# Patient Record
Sex: FEMALE | Race: BLACK OR AFRICAN AMERICAN | Employment: UNEMPLOYED | URBAN - METROPOLITAN AREA
[De-identification: names, ages, dates, MRNs, and addresses within clinical notes are randomized per-mention and may not be internally consistent; named-entity substitution may affect disease eponyms.]

---

## 2022-08-29 NOTE — BH NOTES
TRANSFER - IN REPORT:    Verbal report received from GUILLERMO MORENO [facility]) on Samuel So  being received from [facility] ED(unit) for routine progression of care      Report consisted of patients Situation, Background, Assessment and   Recommendations(SBAR). Information from the following report(s) SBAR was reviewed with the receiving nurse. Opportunity for questions and clarification was provided. Assessment completed upon patients arrival to unit and care assumed.

## 2022-08-30 ENCOUNTER — HOSPITAL ENCOUNTER (INPATIENT)
Age: 37
LOS: 3 days | Discharge: HOME | DRG: 566 | End: 2022-09-02
Attending: PSYCHIATRY & NEUROLOGY | Admitting: PSYCHIATRY & NEUROLOGY
Payer: MEDICAID

## 2022-08-30 PROBLEM — F32.9 MDD (MAJOR DEPRESSIVE DISORDER): Status: ACTIVE | Noted: 2022-08-30

## 2022-08-30 PROCEDURE — 74011250637 HC RX REV CODE- 250/637: Performed by: NURSE PRACTITIONER

## 2022-08-30 PROCEDURE — 65220000003 HC RM SEMIPRIVATE PSYCH

## 2022-08-30 RX ORDER — ADHESIVE BANDAGE
30 BANDAGE TOPICAL DAILY PRN
Status: DISCONTINUED | OUTPATIENT
Start: 2022-08-30 | End: 2022-08-30

## 2022-08-30 RX ORDER — HYDROXYZINE 50 MG/1
50 TABLET, FILM COATED ORAL
Status: DISCONTINUED | OUTPATIENT
Start: 2022-08-30 | End: 2022-08-30

## 2022-08-30 RX ORDER — DIPHENHYDRAMINE HYDROCHLORIDE 50 MG/ML
50 INJECTION, SOLUTION INTRAMUSCULAR; INTRAVENOUS
Status: DISCONTINUED | OUTPATIENT
Start: 2022-08-30 | End: 2022-08-30

## 2022-08-30 RX ORDER — ACETAMINOPHEN 325 MG/1
650 TABLET ORAL
Status: DISCONTINUED | OUTPATIENT
Start: 2022-08-30 | End: 2022-08-30

## 2022-08-30 RX ORDER — BENZTROPINE MESYLATE 1 MG/1
1 TABLET ORAL
Status: DISCONTINUED | OUTPATIENT
Start: 2022-08-30 | End: 2022-08-30

## 2022-08-30 RX ORDER — FOLIC ACID/MULTIVIT,IRON,MINER 0.4MG-18MG
1 TABLET ORAL DAILY
Status: DISCONTINUED | OUTPATIENT
Start: 2022-08-30 | End: 2022-09-02 | Stop reason: HOSPADM

## 2022-08-30 RX ADMIN — Medication 1 TABLET: at 09:38

## 2022-08-30 NOTE — H&P
[address] [facility] Adult  Hospitalist Group  History and Physical    Date of Service:  8/30/2022  Primary Care Provider: None  Source of information: The patient and Chart review    Chief Complaint: No chief complaint on file. History of Presenting Illness:   Last Robb is a 40 y.o. female was 31 weeks pregnant, presented to emergency department at outside facility. Patient was evaluated by behavioral health at that facility and referred to inpatient psychiatry for admission. Had endorsed depression and suicidal ideation. She does have a history of postpartum depression as well as MDD  Inpatient psychiatry provider, nurse practitioner Montrell over the hospitalist service for medical clearance and evaluation  At the moment of the initial interview she was cooperative with the exam.  She denied pain, fever, chills, nausea, dysuria. REVIEW OF SYSTEMS:  A comprehensive review of systems was negative except for that written in the History of Present Illness. No past medical history on file. No past surgical history on file. Prior to Admission medications    Not on File     No Known Allergies   No family history on file. Social History:       Family and social history were personally reviewed, all pertinent and relevant details are outlined as above.     Objective:   Visit Vitals  /66   Pulse 69   Temp 97.7 °F (36.5 °C)   Resp 16   SpO2 99%           PHYSICAL EXAM:   General: Alert x oriented x 3, awake, no acute distress, resting in bed, pleasant female, appears to be stated age  HEENT: PEERL, EOMI, moist mucus membranes  Neck: Supple, no JVD,   Chest: Clear to auscultation bilaterally   CVS: RRR, S1 S2 heard, no murmurs/rubs/gallops  Abd: Soft, non-tender, non-distended, +bowel sounds   Ext: No clubbing, no cyanosis, no edema  Neuro/Psych: Pleasant mood and affect,  sensory grossly within normal limit, Strength 5/5 in all extremities,   Cap refill: Brisk, less than 3 seconds  Pulses: 2+, symmetric in all extremities  Skin: Warm, dry, without rashes or lesions    Data Review: All diagnostic labs and studies have been reviewed. Abnormal Labs Reviewed - No data to display    All Micro Results       None            IMAGING:   No orders to display        ECG/ECHO:  No results found for this or any previous visit. Assessment:   Given the patient's current clinical presentation, there is a high level of concern for decompensation if discharged from the emergency department. Complex decision making was performed, which includes reviewing the patient's available past medical records, laboratory results, and imaging studies. Active Problems:    MDD (major depressive disorder) (8/30/2022)      Plan:   MDD  Per primary team    Pregnancy  Patient tells me she is high risk pregnant. OB hospitalist is aware she is here  Starting prenatal vitamin  Otherwise follow-up with primary OB      For the opportunity to participate in the care of this patient please call with any questions      Signed By: Perri Andrews NP     August 30, 2022         Please note that this dictation may have been completed with Mina Escalera, the computer voice recognition software. Quite often unanticipated grammatical, syntax, homophones, and other interpretive errors are inadvertently transcribed by the computer software. Please disregard these errors. Please excuse any errors that have escaped final proofreading.

## 2022-08-30 NOTE — PROGRESS NOTES
Patient participated in spirituality group on Aug 30, 2022. Rev.  Wendel Aase, MDiv, [facility], [facility]   paging service: [phone])

## 2022-08-30 NOTE — INTERDISCIPLINARY ROUNDS
Behavioral Health Interdisciplinary Rounds     Patient Name: Maris Waters  Age: 40 y.o. Room/Bed:  727/  Primary Diagnosis: <principal problem not specified>   Admission Status: TDO     Readmission within 30 days: no  Power of  in place: no  Patient requires a blocked bed: no               Consults: H&P         Labs/Testing due today? Have they refused labs?: no    Sleep hours: 3 hrs       Participation in Care/Groups:    Medication Compliant?:  PRNS (last 24 hours): none    Restraints (last 24 hours):  no     CIWA (range last 24 hours):     COWS (range last 24 hours):      Alcohol screening (AUDIT) completed -         If applicable, date SBIRT discussed in treatment team AND documented:   AUDIT Screen Score:        Document Brief Intervention (corresponds directly with the 5 A's, Ask, Advise, Assess, Assist, and Arrange): At- Risk Patients (Score 7-15 for women; 8-15 for men)  Discuss concern patient is drinking at unhealthy levels known to increase risk of alcohol-related health problems. Is Patient ready to commit to change? If No:  Encourage reflection  Discuss short term and long term health risks of consuming alcohol  Barriers to change  Reaffirm willingness to help / Educational materials provided  If Yes:  Set goal  Plan  Educational materials provided    Harmful use or Dependence (Score 16 or greater)  Discuss short term and long term health risks of consuming alcohol  Recommendations  Negotiate drinking goal  Recommend addiction specialist/center  Arrange follow-up appointments.     Tobacco - patient is a smoker:    Illegal Drugs use:      24 hour chart check complete: yes   ____________________________________________________________________________________________________________    Patient goal(s) for today:   Treatment team focus/goals:   Progress note     LOS:  0  Expected LOS:     Financial concerns/prescription coverage:    Family contact:       Family requesting physician contact today:    Discharge plan:   Access to weapons :         Outpatient provider(s):   Patient's preferred phone number for follow up call :   Patient's preferred e-mail address :  Participating treatment team members: Marissa Up, * (assigned SW),

## 2022-08-30 NOTE — BH NOTES
PSYCHOSOCIAL ASSESSMENT  :Patient identifying info:   Terry Alvarez is a 40 y.o., female admitted 2022  1:57 AM     Presenting problem and precipitating factors: Pt was transferred to [facility] from [facility] for SI and detox. Pt is 30 wks pregnant. Mental status assessment: Pt is alert and oriented x4. Pt denies current SI, HI, AVH. Pts mood is euthymic and congruent with appearance. Pt is calm, cooperative. Strengths/Recreation/Coping Skills:    Collateral information:     Current psychiatric /substance abuse providers and contact info: Maria Victoria Matthews, [phone], Dr. Fermín Grant    Previous psychiatric/substance abuse providers and response to treatment: Pt has hx of ISABELLA tx    Family history of mental illness or substance abuse: unknown    Substance abuse history:  Pt uses crack/cocaine, THC  Social History     Tobacco Use    Smoking status: Not on file    Smokeless tobacco: Not on file   Substance Use Topics    Alcohol use: Not on file       History of biomedical complications associated with substance abuse: no     Patient's current acceptance of treatment or motivation for change: TDO     Family constellation: , 8 Children, pregnant    Is significant other involved? No     Describe support system:  Mother     Describe living arrangements and home environment: Pt lives with mother     GUARDIAN/POA: NO    Guardian Name:     Guardian Contact:     Health issues:   Hospital Problems  Never Reviewed            Codes Class Noted POA    MDD (major depressive disorder) ICD-10-CM: F32.9  ICD-9-CM: 296.20  2022 Unknown           Trauma history: yes    Legal issues: no     History of  service: no     Financial status: unknown    Rastafarian/cultural factors: not reported     Education/work history: Unemployed    Have you been licensed as a health care professional (current or ): no     Describe coping skills:Ineffectual     Ramila Deter  2022

## 2022-08-30 NOTE — PROGRESS NOTES
Patient up and out on unit for meals. Med/meal compliant; denies SI/HI. Calm/cooperative.  Staff focus is to ensure patient remains safe while in the hospital.   Problem: Depressed Mood (Adult/Pediatric)  Goal: *STG: Complies with medication therapy  8/30/2022 1831 by Alejandra Goodman, RN  Outcome: Progressing Towards Goal  8/30/2022 1613 by Alejandra Goodman, RN  Outcome: Progressing Towards Goal  Goal: Interventions  Outcome: Progressing Towards Goal

## 2022-08-30 NOTE — BH NOTES
GROUP THERAPY PROGRESS NOTE    Patient did not participate in Healthy living, coping and wellness education group.     Mel SOLANO, [facility]

## 2022-08-30 NOTE — PROGRESS NOTES
Problem: Depressed Mood (Adult/Pediatric)  Goal: *STG: Participates in treatment plan  Outcome: Progressing Towards Goal  Note: Out on unit demonstrates ability to interact w peers and staff. Mood and affect congruent, sad and slightly anxious. Denies SI, no self harming behaviors. Daily goal is to talk with tx team about dc planning and follow up care. Staff focus is on offering [phone]: OB consult Dr. Radha Diehl [phone] called requested clarification on purpose of consult from hospitalist Rissa Carlson NP. Perfect served message sent to him. Will await call back . 1334: pt confirmed with family OB/GYN appointment is made with [facility]. Appointment and office info entered in d/c follow up. Tx team made aware.    Goal: *STG: Demonstrates reduction in symptoms and increase in insight into coping skills/future focused  Outcome: Progressing Towards Goal  Goal: Interventions  Outcome: Progressing Towards Goal     Problem: Chemical Dependency (Adult/Pediatric)  Goal: *STG: Will identify negative impact of chemical dependency including the use of tobacco, alcohol, and other substances  Outcome: Progressing Towards Goal  Goal: *STG: Agrees to participate in outpatient after care program to support ongoing mental health  Outcome: Devin Howard Treatment Plan for Mariposa Garland    Date Treatment Plan Initiated: 8/30/22    Treatment Plan Modalities:  Type of Modality Amount  (x minutes) Frequency (x/week) Duration (x days) Name of Responsible Staff   Community & wrap-up meetings to encourage peer interactions 15 7 1 Jose Palumbo     Group psychotherapy to assist in building coping skills and internal controls 60 7 1 Valeri Antonio MSW   Therapeutic activity groups to build coping skills 60 7 1 Valeri Antonio MSW   Psychoeducation in group setting to address:   Medication education   [address] PharmD   Coping skills   30 3 [phone] [facility], Daniel Alcocer   Relaxation techniques         Symptom management         Discharge planning   60 2 1 Earnestine Lee   Spirituality    60 2 1 Chaplain BENAVIDES   61 1 1 volunteer   Recovery/AA/NA      volunteer   Physician medication management   15 7 [address] [facility]   Family meeting/discharge planning   15 2 1 Brigid Navarrete

## 2022-08-30 NOTE — BH NOTES
Admission Note  Admitting Diagnosis: SI, depression/anxiety, polysubstance abuse        Admitting Status: [facility]        Patient Received From: Miracle Mackey        Patient Condition Upon Arrival: Cooperative        BAL & UDS: Bal neg, UDS + cocaine, THC, oxycodone     Reason For Admission: SI plan to OD     Skin assessment completed by: Lior Jeronimo RN    Toni score: 23, left black eye    Patient belongings secured by: Noah Rodriguez    Patient consented to safety while on the unit. Patient signed consent forms upon arriving. Denies suicidal and homicidal ideation. Does not appear to be responding to internal stimuli. Patient was calm and cooperative, didn't want to answer questions during admission process. Will continue to monitor with Q15 minute checks.

## 2022-08-31 LAB — UR CULT HOLD, URHOLD: NORMAL

## 2022-08-31 PROCEDURE — 74011250637 HC RX REV CODE- 250/637: Performed by: NURSE PRACTITIONER

## 2022-08-31 PROCEDURE — 87491 CHLMYD TRACH DNA AMP PROBE: CPT

## 2022-08-31 PROCEDURE — 65220000003 HC RM SEMIPRIVATE PSYCH

## 2022-08-31 RX ORDER — ESCITALOPRAM OXALATE 10 MG/1
10 TABLET ORAL DAILY
Status: DISCONTINUED | OUTPATIENT
Start: 2022-08-31 | End: 2022-09-02 | Stop reason: HOSPADM

## 2022-08-31 RX ADMIN — ESCITALOPRAM OXALATE 10 MG: 10 TABLET ORAL at 11:00

## 2022-08-31 RX ADMIN — Medication 1 TABLET: at 10:09

## 2022-08-31 NOTE — INTERDISCIPLINARY ROUNDS
Behavioral Health Interdisciplinary Rounds     Patient Name: Raul Officer  Age: 40 y.o. Room/Bed:  72/  Primary Diagnosis: <principal problem not specified>   Admission Status: TDO     Readmission within 30 days: no  Power of  in place: no  Patient requires a blocked bed: no            Consults: OBGYN       Labs/Testing due today? Have they refused labs?: yes    Sleep hours:  6      Participation in Care/Groups:  yes  Medication Compliant?: Yes  PRNS (last 24 hours): None    Restraints (last 24 hours):  no     CIWA (range last 24 hours):     COWS (range last 24 hours):      Alcohol screening (AUDIT) completed -         If applicable, date SBIRT discussed in treatment team AND documented:   AUDIT Screen Score:        Document Brief Intervention (corresponds directly with the 5 A's, Ask, Advise, Assess, Assist, and Arrange): At- Risk Patients (Score 7-15 for women; 8-15 for men)  Discuss concern patient is drinking at unhealthy levels known to increase risk of alcohol-related health problems. Is Patient ready to commit to change? If No:  Encourage reflection  Discuss short term and long term health risks of consuming alcohol  Barriers to change  Reaffirm willingness to help / Educational materials provided  If Yes:  Set goal  Plan  Educational materials provided    Harmful use or Dependence (Score 16 or greater)  Discuss short term and long term health risks of consuming alcohol  Recommendations  Negotiate drinking goal  Recommend addiction specialist/center  Arrange follow-up appointments.     Tobacco - patient is a smoker:    Illegal Drugs use:      24 hour chart check complete: yes   ____________________________________________________________________________________________________________    Patient goal(s) for today:  Treatment team focus/goals:  Progress note    LOS:  1  Expected LOS:     Financial concerns/prescription coverage:    Family contact:       Family requesting physician contact today:    Discharge plan:   Access to weapons :         Outpatient provider(s):   Patient's preferred phone number for follow up call :   Patient's preferred e-mail address :  Participating treatment team members: Radha Bass * (assigned SW),

## 2022-08-31 NOTE — PROGRESS NOTES
Problem: Falls - Risk of  Goal: *Absence of Falls  Description: Document Dejah Mendiola Fall Risk and appropriate interventions in the flowsheet. Outcome: Progressing Towards Goal  Note: Fall Risk Interventions:            Medication Interventions: Teach patient to arise slowly       Patient received, appears to be asleep, eyes closed, breathing even and unlabored. No signs of distress noted. Will continue to monitor for safety.

## 2022-08-31 NOTE — PROGRESS NOTES
Patient visible on unit. NAD. Meal and medication compliant. Calm, cooperative, and pleasant. Remains safe on unit. Will continue to monitor with q15min rounds and provide support. Problem: Falls - Risk of  Goal: *Absence of Falls  Description: Document [place] Fall Risk and appropriate interventions in the flowsheet.   Outcome: Progressing Towards Goal  Note: Fall Risk Interventions:            Medication Interventions: Teach patient to arise slowly                   Problem: Depressed Mood (Adult/Pediatric)  Goal: *STG: Participates in treatment plan  Outcome: Progressing Towards Goal  Goal: *STG: Demonstrates reduction in symptoms and increase in insight into coping skills/future focused  Outcome: Progressing Towards Goal  Goal: *STG: Complies with medication therapy  Outcome: Progressing Towards Goal
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
## 2022-08-31 NOTE — BH NOTES
PSYCHIATRIC PROGRESS NOTE       Patient: María Elena Varner MRN: [id number]  SSN: [id number]    YOB: 1985  Age: 40 y.o. Sex: female      Admit Date: 8/30/2022    LOS: 1 day       Chief Complaint:  Can I get tested for hiv? Interval History:  María Elena Varner volunteered from her hearing this morning. She is asking for her lexapro, she is aware of the risks of it, and gave verbal informed consent to be started. She is also requesting to get tested for hiv and std. She denies si hi or avh. Sleeping and eating ok. Calm and pleasant. She agreed for CM to speak to her mother. Past Medical History:  No past medical history on file. ALLERGIES:(reviewed/updated 8/31/2022)  No Known Allergies    Laboratory report:  No results found for: WBC, WBCLT, HGBPOC, HGB, HGBP, HCTPOC, HCT, PHCT, RBCH, PLT, MCV, HGBEXT, HCTEXT, PLTEXT No results found for: NA, K, CL, CO2, AGAP, GLU, BUN, CREA, BUCR, GFRAA, GFRNA, CA, TBIL, TBILI, AP, TP, ALB, GLOB, AGRAT, ALT   Vitals:    08/30/22 0800 08/30/22 1330 08/30/22 2114 08/31/22 0819   BP: 106/63 107/66 98/64 98/60   Pulse: 88 69 74 78   Resp: 18 16 17 18   Temp: 97.9 °F (36.6 °C) 97.7 °F (36.5 °C) 98.4 °F (36.9 °C) 97.5 °F (36.4 °C)   SpO2:   92% 98%       No results found for: VALF2, VALAC, VALP, VALPR, DS6, CRBAM, CRBAMP, CARB2, XCRBAM  No results found for: LITHM    Vital Signs  Patient Vitals for the past 24 hrs:   Temp Pulse Resp BP SpO2   08/31/22 0819 97.5 °F (36.4 °C) 78 18 98/60 98 %   08/30/22 2114 98.4 °F (36.9 °C) 74 17 98/64 92 %   08/30/22 1330 97.7 °F (36.5 °C) 69 16 107/66 --     Wt Readings from Last 3 Encounters:   No data found for Wt     Temp Readings from Last 3 Encounters:   08/31/22 97.5 °F (36.4 °C)     BP Readings from Last 3 Encounters:   08/31/22 98/60     Pulse Readings from Last 3 Encounters:   08/31/22 78       Radiology (reviewed/updated 8/31/2022)  No results found.     Current Facility-Administered Medications   Medication Dose Route Frequency Provider Last Rate Last Admin    prenatal vitamin tablet 1 Tablet  1 Tablet Oral DAILY Prachi Bales NP   1 Tablet at 08/30/22 5504       Side Effects: (reviewed/updated 8/31/2022)  None reported or admitted to. Review of Systems: (reviewed/updated 8/31/2022)  Appetite: good  Sleep: good   All other Review of Systems: negative    Mental Status Exam:  Eye contact: Good eye contact  Psychomotor activity: wnl  Speech is spontaneous  Thought process: Logical and goal directed   Mood is \"ok\"  Affect: Blunted  Perception: No avh  Suicidal ideation: No si  Homicidal ideation: No hi  Insight/judgment: Poor  Cognition is grossly intact      Physical Exam:  Musculoskeletal system: steady gait  Tremor not present  Cog wheeling not present      Assessment and Plan:  Mariposa Garland meets criteria for a diagnosis of Unspecified mood disorder. Stimulant use disorder, cocaine, severe. Opioid use disorder. Cannabis use disorder. Lexapro 10 mg daily. Continue rest of medications as prescribed. We will closely monitor for safety. We will encourage reality orientation. Disposition planning to continue. I certify that this patients inpatient psychiatric hospital services furnished since the previous certification were, and continue to be, required for treatment that could reasonably be expected to improve the patient's condition, or for diagnostic study, and that the patient continues to need, on a daily basis, active treatment furnished directly by or requiring the supervision of inpatient psychiatric facility personnel. In addition, the hospital records show that services furnished were intensive treatment services, admission or related services, or equivalent services.       Signed:  Cecilia Huang NP  8/31/2022

## 2022-09-01 LAB
CHOLEST SERPL-MCNC: 167 MG/DL
GLUCOSE P FAST SERPL-MCNC: 81 MG/DL (ref 65–100)
HDLC SERPL-MCNC: 65 MG/DL
HDLC SERPL: 2.6 {RATIO} (ref 0–5)
LDLC SERPL CALC-MCNC: 73.6 MG/DL (ref 0–100)
RPR SER QL: NONREACTIVE
TRIGL SERPL-MCNC: 142 MG/DL (ref ?–150)
TSH SERPL DL<=0.05 MIU/L-ACNC: 0.88 UIU/ML (ref 0.36–3.74)
VLDLC SERPL CALC-MCNC: 28.4 MG/DL

## 2022-09-01 PROCEDURE — 65220000003 HC RM SEMIPRIVATE PSYCH

## 2022-09-01 PROCEDURE — 84443 ASSAY THYROID STIM HORMONE: CPT

## 2022-09-01 PROCEDURE — 74011250637 HC RX REV CODE- 250/637: Performed by: NURSE PRACTITIONER

## 2022-09-01 PROCEDURE — 36415 COLL VENOUS BLD VENIPUNCTURE: CPT

## 2022-09-01 PROCEDURE — 80061 LIPID PANEL: CPT

## 2022-09-01 PROCEDURE — 87536 HIV-1 QUANT&REVRSE TRNSCRPJ: CPT

## 2022-09-01 PROCEDURE — 86592 SYPHILIS TEST NON-TREP QUAL: CPT

## 2022-09-01 PROCEDURE — 82947 ASSAY GLUCOSE BLOOD QUANT: CPT

## 2022-09-01 RX ADMIN — Medication 1 TABLET: at 08:00

## 2022-09-01 RX ADMIN — ESCITALOPRAM OXALATE 10 MG: 10 TABLET ORAL at 08:00

## 2022-09-01 NOTE — PROGRESS NOTES
Problem: Discharge Planning  Goal: *Discharge to safe environment  Outcome: Progressing Towards Goal  Note: Pt will discharge to [facility] program  Goal: *Knowledge of medication management  Outcome: Progressing Towards Goal  Goal: *Knowledge of discharge instructions  Outcome: Progressing Towards Goal

## 2022-09-01 NOTE — INTERDISCIPLINARY ROUNDS
Behavioral Health Interdisciplinary Rounds     Patient Name: Lili Lino  Age: 40 y.o. Room/Bed:  727/  Primary Diagnosis: <principal problem not specified>   Admission Status: Voluntary     Readmission within 30 days: no  Power of  in place: no  Patient requires a blocked bed: no          Reason for blocked bed:     Sleep hours:        Participation in Care/Groups:    Medication Compliant?: Yes  PRNS (last 24 hours): None    Restraints (last 24 hours):  no  Substance Abuse:  no    Alcohol screening (AUDIT) completed -     If applicable, date SBIRT discussed in treatment team AND documented:   Tobacco -   24 hour chart check complete: yes   ______________________________________    Patient goal(s) for today: Communicate needs to staff  Treatment team focus/goals: Assess pt, manage medications, discharge planning   Progress note: Pt is stable. Pt denies SI, HI, AVH. Pt has strong safety plan and follow up in place. Pt s mood is euthymic and congruent with appearance. SW spokw with pts mother and CM, Ryan Navarro.          Financial concerns/prescription coverage:    Family contact:                        Family requesting physician contact today:    Discharge plan: Pt will discharge to [place] [address] housing   Access to weapons None :                                                              Outpatient provider(s): Mariah Quigley, Dr Junaid Jane  Patient's preferred phone number for follow up call :   Patient's preferred e-mail address :  Participating treatment team members:    LOS:  2 Expected LOS: 9/2/22    Participating treatment team members: Lili Lino, XIOMARA Warner, Aric Bernal, MICHAEL

## 2022-09-01 NOTE — PROGRESS NOTES
Problem: Depressed Mood (Adult/Pediatric)  Goal: *STG: Participates in treatment plan  Outcome: Progressing Towards Goal  Goal: *STG: Attends activities and groups  Outcome: Progressing Towards Goal  Goal: *STG: Complies with medication therapy  Outcome: Progressing Towards Goal   Patient is resting quietly in bed with eyes closed. Appears to be asleep. No respiratory distress noted. 15 minutes safety monitoring continues.

## 2022-09-01 NOTE — DISCHARGE INSTRUCTIONS
DISCHARGE SUMMARY    Dorota Boland  : 1985  MRN: [id number]    The patient Corbin Springer exhibits the ability to control behavior in a less restrictive environment. Patient's level of functioning is improving. No assaultive/destructive behavior has been observed for the past 24 hours. No suicidal/homicidal threat or behavior has been observed for the past 24 hours. There is no evidence of serious medication side effects. Patient has not been in physical or protective restraints for at least the past 24 hours. If weapons involved, how are they secured? none    Is patient aware of and in agreement with discharge plan? yes    Arrangements for medication:  Prescriptions given to patient, given a weeks supply or 30 day supply. Copy of discharge instructions to provider?:  yes    Arrangements for transportation home:  family will      Keep all follow up appointments as scheduled, continue to take prescribed medications per physician instructions.   Mental health crisis number:  [phone] or your local mental health crisis line number at [address] [facility] WARM LINE      [phone] ([phone])      M-F: 9am to 9pm      Sat & Sun: [address] [facility] suicide prevention lines:                             0-286-KIWOSTP ([phone])       [phone] ([phone])    Crisis Text Line:  Text HOME to [phone]

## 2022-09-01 NOTE — BH NOTES
GROUP THERAPY PROGRESS NOTE     Patient is participating in Yoga Therapy Group     Group time: 60 minutes     Personal goal for participation:  To develop mindfulness skills and increase mind/body awareness     Goal orientation: Personal     Group therapy participation: Active     Therapeutic interventions reviewed and discussed:  Group members were guided through a series of therapeutic exercises including eye movements, poses, breathing, and a guided deep relaxation. Patients were encouraged to practice mindfulness and focus on relaxation and deep breathing. Impression of participation:  Ganesh Espinoza participated in the group where she could and was able to verbalize an understanding of how some of the practices could help her feel calm.

## 2022-09-01 NOTE — BH NOTES
PSYCHIATRIC PROGRESS NOTE       Patient: Lazara Estevez MRN: [id number]  SSN: [id number]    YOB: 1985  Age: 40 y.o. Sex: female      Admit Date: 8/30/2022    LOS: 2 days       Chief Complaint:  I am doing better. Interval History:  Lazara Estevez says she is doing well. She is out in the unit, social with staff and peers. Sleeping and eating fairly well. She denies si hi or avh. She is calm and pleasant. She has a good insight, she is fully aware how substance use has affected her and the people around her. STD and HIV results are pending. At the present time the patient Lazara Estevez remains compliant with taking medications. Denies any adverse events from taking them and feels they have been beneficial.     Past Medical History:  No past medical history on file.       ALLERGIES:(reviewed/updated 9/1/2022)  No Known Allergies    Laboratory report:  No results found for: WBC, WBCLT, HGBPOC, HGB, HGBP, HCTPOC, HCT, PHCT, RBCH, PLT, MCV, HGBEXT, HCTEXT, PLTEXT, HGBEXT, HCTEXT, PLTEXT   Lab Results   Component Value Date/Time    Glucose 81 09/01/2022 06:15 AM      Vitals:    08/31/22 0907 08/31/22 1606 08/31/22 2035 09/01/22 0836   BP:  111/62 122/64 103/61   Pulse:  74 90 83   Resp:  16 16 16   Temp:  97.9 °F (36.6 °C) 98.4 °F (36.9 °C) 98 °F (36.7 °C)   SpO2:  100% 99% 98%   Weight: 63.5 kg (140 lb)      Height: 5' (1.524 m)          No results found for: VALF2, VALAC, VALP, VALPR, DS6, CRBAM, CRBAMP, CARB2, XCRBAM  No results found for: LITHM    Vital Signs  Patient Vitals for the past 24 hrs:   Temp Pulse Resp BP SpO2   09/01/22 0836 98 °F (36.7 °C) 83 16 103/61 98 %   08/31/22 2035 98.4 °F (36.9 °C) 90 16 122/64 99 %   08/31/22 1606 97.9 °F (36.6 °C) 74 16 111/62 100 %       Wt Readings from Last 3 Encounters:   08/31/22 63.5 kg (140 lb)     Temp Readings from Last 3 Encounters:   09/01/22 98 °F (36.7 °C)     BP Readings from Last 3 Encounters:   09/01/22 103/61     Pulse Readings from Last 3 Encounters:   09/01/22 83       Radiology (reviewed/updated 9/1/2022)  No results found. Current Facility-Administered Medications   Medication Dose Route Frequency Provider Last Rate Last Admin    escitalopram oxalate (LEXAPRO) tablet 10 mg  10 mg Oral DAILY Laverne AllenMICHAEL   10 mg at 09/01/22 0800    prenatal vitamin tablet 1 Tablet  1 Tablet Oral DAILY Jose Stafford NP   1 Tablet at 09/01/22 0800       Side Effects: (reviewed/updated 9/1/2022)  None reported or admitted to. Review of Systems: (reviewed/updated 9/1/2022)  Appetite: good  Sleep: good   All other Review of Systems: negative    Mental Status Exam:  Eye contact: Good eye contact  Psychomotor activity: wnl  Speech is spontaneous  Thought process: Logical and goal directed   Mood is \"ok\"  Affect: full  Perception: No avh  Suicidal ideation: No si  Homicidal ideation: No hi  Insight/judgment: Poor  Cognition is grossly intact      Physical Exam:  Musculoskeletal system: steady gait  Tremor not present  Cog wheeling not present      Assessment and Plan:  Marissa Up meets criteria for a diagnosis of Unspecified mood disorder. Stimulant use disorder, cocaine, severe. Opioid use disorder. Cannabis use disorder. Continue current medications as prescribed. We will closely monitor for safety. We will encourage reality orientation. Disposition planning to continue. I certify that this patients inpatient psychiatric hospital services furnished since the previous certification were, and continue to be, required for treatment that could reasonably be expected to improve the patient's condition, or for diagnostic study, and that the patient continues to need, on a daily basis, active treatment furnished directly by or requiring the supervision of inpatient psychiatric facility personnel.  In addition, the hospital records show that services furnished were intensive treatment services, admission or related services, or equivalent services.       Signed:  Kathe Joy NP  9/1/2022

## 2022-09-01 NOTE — GROUP NOTE
ETHAN  GROUP DOCUMENTATION INDIVIDUAL                                                                          Group Therapy Note    Date: 9/1/2022    Group Start Time: 5698  Group End Time: 0815  Group Topic: Community Meeting    [facility] [address] [facility]; [place], [address] [place] [address] GROUP DOCUMENTATION GROUP    Group Therapy Note    Attendees: 11/12       Attendance: Attended    Patient's Goal:  Understand unit rules and routine. Interventions/techniques: Informed and Supported    Follows Directions: Followed directions    Interactions: Interacted appropriately    Mental Status: Calm    Behavior/appearance: Cooperative    Goals Achieved: Able to engage in interactions, Able to listen to others, Able to reflect/comment on own behavior, and Able to manage/cope with feelings      Additional Notes: [address] group discussing rules and routine of General Unit. Patient and peers discussed their goals and set times to achieve them within the day. Opportunity for questions was offered.     [address]

## 2022-09-02 VITALS
HEART RATE: 72 BPM | OXYGEN SATURATION: 98 % | BODY MASS INDEX: 27.48 KG/M2 | WEIGHT: 140 LBS | RESPIRATION RATE: 14 BRPM | HEIGHT: 60 IN | TEMPERATURE: 98.3 F | SYSTOLIC BLOOD PRESSURE: 133 MMHG | DIASTOLIC BLOOD PRESSURE: 73 MMHG

## 2022-09-02 LAB
HIV1 RNA # SERPL NAA+PROBE: <20 COPIES/ML
HIV1 RNA SERPL NAA+PROBE-LOG#: NORMAL LOG10COPY/ML

## 2022-09-02 PROCEDURE — 74011250637 HC RX REV CODE- 250/637: Performed by: NURSE PRACTITIONER

## 2022-09-02 RX ORDER — FOLIC ACID/MULTIVIT,IRON,MINER 0.4MG-18MG
1 TABLET ORAL DAILY
Qty: 30 TABLET | Refills: 0 | Status: SHIPPED | OUTPATIENT
Start: 2022-09-03 | End: 2022-10-03

## 2022-09-02 RX ORDER — ESCITALOPRAM OXALATE 10 MG/1
10 TABLET ORAL DAILY
Qty: 30 TABLET | Refills: 0 | Status: SHIPPED | OUTPATIENT
Start: 2022-09-03 | End: 2022-10-03

## 2022-09-02 RX ADMIN — ESCITALOPRAM OXALATE 10 MG: 10 TABLET ORAL at 09:25

## 2022-09-02 RX ADMIN — Medication 1 TABLET: at 09:25

## 2022-09-02 NOTE — INTERDISCIPLINARY ROUNDS
Behavioral Health Interdisciplinary Rounds     Patient Name: María Elena Varner  Age: 40 y.o. Room/Bed:  742/  Primary Diagnosis: <principal problem not specified>   Admission Status: Voluntary     Readmission within 30 days: no  Power of  in place: no  Patient requires a blocked bed: no          Reason for blocked bed:   Sleep hours: 7       Participation in Care/Groups:  no  Medication Compliant?: Yes  PRNS (last 24 hours): None    Restraints (last 24 hours):  no     Alcohol screening (AUDIT) completed -     If applicable, date SBIRT discussed in treatment team AND documented:    Tobacco - patient is a smoker: Have You Used Tobacco in the Past 30 Days: Yes  Illegal Drugs use: Have You Used Any Illegal Substances Over the Past 12 Months: No    24 hour chart check complete:      _______________________________________________    Patient goal(s) for today: Discharge   Treatment team focus/goals:   Progress note: Pt is stable. Pt denies SI, HI, AVH        Financial concerns/prescription coverage:    Family contact:                        Family requesting physician contact today:    Discharge plan: Pt will discharge to [facility]  Access to weapons no :                                                               Outpatient provider(s): Gabriele Crane, [address] [place] [address]  Patient's preferred phone number for follow up call :   Patient's preferred e-mail address :  Participating treatment team members:    LOS:  3 Expected LOS: 9/2/22    Participating treatment team members: Sandy Ford, MSW, Dr. Durga Krishna

## 2022-09-02 NOTE — BH NOTES
Behavioral Health Transition Record to Provider    Patient Name: Krista Allen  YOB: 1985  Medical Record Number: [id number]  Date of Admission: 8/30/2022  Date of Discharge: 9/2/22    Attending Provider: Jo Paiz MD  Discharging Provider: Dr. Melinda Gonzalez  To contact this individual call [phone] and ask the  to page. If unavailable, ask to be transferred to [facility] Provider on call. [facility] Provider will be available on call 24/7 and during holidays. Primary Care Provider: None    No Known Allergies    Reason for Admission: The patient is a 59-year-old 34 weeks' pregnant female who is admitted at [facility] inpatient psychiatric unit on a temporary custodial order. She is a transfer from [facility]. She presented to the hospital with worsening mood, depression, and suicidal ideation. She states that she was in crisis due to the repeated failed attempts to get sober. She had endorsed that she ended up entertaining thoughts of suicide with a plan to overdose. The patient has a long struggle with substance use; particularly cocaine, THC. She reports that her drugs of choice are cocaine and THC. She reports that she also has been taking oxycodone at night from generalized pain. She is unable to pin down how much she has been using, but she admits that she has been using a significant amount. She also shares that she had been quite depressed. She states that she has 8 children and one on the way. She had her first son at age 15 and she kept him until he was almost 2, and she noted that she would keep her children up until age 3 and 1 and then she would give them away  to their fathers or to the patient's mother. She reports that she was previously on Lexapro and would like to get back on it.   I informed her that the risk of starting the medication such as fetal malformation and teratogenicity and she indicated understanding. She gave verbal informed consent to be started on Lexapro. She denies suicidal ideation, homicidal ideation, auditory or visual hallucinations. Her plan is to get her substance use controlled. Admission Diagnosis: MDD (major depressive disorder) [F32.9]    * No surgery found *    Results for orders placed or performed during the hospital encounter of 08/30/22   URINE CULTURE HOLD SAMPLE    Specimen: Serum   Result Value Ref Range    Urine culture hold        Urine on hold in Microbiology dept for 2 days. If unpreserved urine is submitted, it cannot be used for addtional testing after 24 hours, recollection will be required. GLUCOSE, FASTING   Result Value Ref Range    Glucose 81 65 - 100 MG/DL   LIPID PANEL   Result Value Ref Range    Cholesterol, total 167 <200 MG/DL    Triglyceride 142 <150 MG/DL    HDL Cholesterol 65 MG/DL    LDL, calculated 73.6 0 - 100 MG/DL    VLDL, calculated 28.4 MG/DL    CHOL/HDL Ratio 2.6 0.0 - 5.0     RPR   Result Value Ref Range    RPR NONREACTIVE NR     TSH 3RD GENERATION   Result Value Ref Range    TSH 0.88 0.36 - 3.74 uIU/mL       Immunizations administered during this encounter: There is no immunization history on file for this patient. Screening for Metabolic Disorders for Patients on Antipsychotic Medications  (Data obtained from the EMR)    Estimated Body Mass Index  Estimated body mass index is 27.34 kg/m² as calculated from the following:    Height as of this encounter: 5' (1.524 m). Weight as of this encounter: 63.5 kg (140 lb).      Vital Signs/Blood Pressure  Visit Vitals  /73 (BP 1 Location: Left upper arm, BP Patient Position: Sitting)   Pulse 72   Temp 98.3 °F (36.8 °C)   Resp 14   Ht 5' (1.524 m)   Wt 63.5 kg (140 lb)   SpO2 98%   BMI 27.34 kg/m²       Blood Glucose/Hemoglobin A1c  Lab Results   Component Value Date/Time    Glucose 81 09/01/2022 06:15 AM       No results found for: HBA1C, [id number]     Lipid Panel  Lab Results Component Value Date/Time    Cholesterol, total 167 09/01/2022 06:15 AM    HDL Cholesterol 65 09/01/2022 06:15 AM    LDL, calculated 73.6 09/01/2022 06:15 AM    Triglyceride 142 09/01/2022 06:15 AM    CHOL/HDL Ratio 2.6 09/01/2022 06:15 AM        Discharge Diagnosis: Unspecified mood disorder. Stimulant use disorder, cocaine, severe. Opioid use disorder. Cannabis use disorder    Discharge Plan: The patient Shana Vidal exhibits the ability to control behavior in a less restrictive environment. Patient's level of functioning is improving. No assaultive/destructive behavior has been observed for the past 24 hours. No suicidal/homicidal threat or behavior has been observed for the past 24 hours. There is no evidence of serious medication side effects. Patient has not been in physical or protective restraints for at least the past 24 hours. If weapons involved, how are they secured? none    Is patient aware of and in agreement with discharge plan? yes    Arrangements for medication:  Prescriptions given to patient, given a weeks supply or 30 day supply. Copy of discharge instructions to provider?:  yes    Arrangements for transportation home:  family will      Keep all follow up appointments as scheduled, continue to take prescribed medications per physician instructions. Mental health crisis number:  [phone] or your local mental health crisis line number at [address] [facility] WARM LINE      [phone] ([phone])      M-F: 9am to 9pm      Sat & Sun: [address] [place] [address] suicide prevention lines:                             0-872-GPMCWRZ ([phone])       [phone] ([phone])   24/7 Crisis Text Line:  Text HOME to [phone]     Discharge Medication List and Instructions:   Current Discharge Medication List        START taking these medications    Details   escitalopram oxalate (LEXAPRO) 10 mg tablet Take 1 Tablet by mouth daily for 30 days.  Indications: major depressive disorder  Qty: 30 Tablet, Refills: 0  Start date: 9/3/2022, End date: 10/3/2022      prenatal vitamin 28 mg iron- 800 mcg tab tablet Take 1 Tablet by mouth daily for 30 days. Indications: pregnancy  Qty: 30 Tablet, Refills: 0  Start date: 9/3/2022, End date: 10/3/2022             Unresulted Labs (24h ago, onward)      None          To obtain results of studies pending at discharge, please contact [phone]    Follow-up Information       Follow up With Specialties Details Why Contact Info    [facility] Obstetrics & Gynecology  Follow up on 9/14/2022  Located in: [facility]  Address: [address] [place] [address]  Hours:   Open · Closes 5PM  Phone: [phone]    Francisca Boeck  Go on 9/6/2022 For Follow Up with Dr. Shaquille Mcdonald / [address],  [place], [address] [facility]   [phone]    None    None ([phone]) Patient stated that they have no PCP              Advanced Directive:   Does the patient have an appointed surrogate decision maker? No  Does the patient have a Medical Advance Directive? No  Does the patient have a Psychiatric Advance Directive? No  If the patient does not have a surrogate or Medical Advance Directive AND Psychiatric Advance Directive, the patient was offered information on these advance directives Patient declined to complete    Patient Instructions: Please continue all medications until otherwise directed by physician. Tobacco Cessation Discharge Plan:   Is the patient a smoker and needs referral for smoking cessation? Yes  Patient referred to the following for smoking cessation with an appointment? Refused     Patient was offered medication to assist with smoking cessation at discharge? Refused  Was education for smoking cessation added to the discharge instructions? Yes    Alcohol/Substance Abuse Discharge Plan:   Does the patient have a history of substance/alcohol abuse and requires a referral for treatment?  Yes  Patient referred to the following for substance/alcohol abuse treatment with an appointment? Yes  Patient was offered medication to assist with alcohol cessation at discharge? Not applicable  Was education for substance/alcohol abuse added to discharge instructions? Yes    Patient discharged to Home; provided to the patient/caregiver either in hard copy or electronically.

## 2022-09-02 NOTE — DISCHARGE SUMMARY
DISCHARGE SUMMARY    Some parts of the discharge summary are from the initial Psychiatric interview that was done on admission by the admitting psychiatrist.     Date of Admission: 8/30/2022    Date of Discharge: 9/2/2022     TYPE OF DISCHARGE:   REGULAR -  YES    AMA - NO  RELEASED BY THE TDO COURT - NO    ADMISSION EVALUATION:    The patient is a 80-year-old 32 weeks' pregnant female who is admitted at [facility] inpatient psychiatric unit on a temporary [facility] order. She is a transfer from [facility] Emergency Department. She presented to the hospital with worsening mood, depression, and suicidal ideation. She states that she was in crisis due to the repeated failed attempts to get sober. She had endorsed that she ended up entertaining thoughts of suicide with a plan to overdose. The patient has a long struggle with substance use; particularly cocaine, THC. She reports that her drugs of choice are cocaine and THC. She reports that she also has been taking oxycodone at night from generalized pain. She is unable to pin down how much she has been using, but she admits that she has been using a significant amount. She also shares that she had been quite depressed. She states that she has 8 children and one on the way. She had her first son at age 15 and she kept him until he was almost 2, and she noted that she would keep her children up until age 3 and 1 and then she would give them away  to their fathers or to the patient's mother. She reports that she was previously on Lexapro and would like to get back on it. I informed her that the risk of starting the medication such as fetal malformation and teratogenicity and she indicated understanding. She gave verbal informed consent to be started on Lexapro. She denies suicidal ideation, homicidal ideation, auditory or visual hallucinations. Her plan is to get her substance use controlled.      She is admitted to the inpatient psychiatric setting for further evaluation and treatment. COURSE IN THE HOSPITAL:    Patient was admitted to the inpatient psychiatry unit for acute psychiatric stabilization in regards to symptomatology as described in the HPI above and placed on Q15 minute checks and withdrawal precautions. While on the unit Kylie De Leon was involved in individual, group, occupational and milieu therapy. She didn't require prn medications. She improved gradually and was able to integrate into the milieu with help from the nursing staff. Patients symptoms improved gradually including her mood lability and feeling down. She was restarted on lexapro and tolerated it well. She was quite on the unit, appropriate in her interactions, and cooperative with medications and the unit routine. Please see individual progress notes for more specific details regarding patient's hospitalization course. Patient was discharged as per the plan. She had been doing well on the unit as per the report of the nursing staff and my observations. No PRN medication for agitation, seclusion or restraints were required during the last 48 hours of her stay. Kylie De Leon had improved progressively to the point of being stable for discharge and outpatient FU. At this time she did not offer any complaints. Patient denied any SI or HI. Denied any AH or VH. She denied any delusions. Was not considered a danger to self or to others and is safe for discharge. Will FU with her appointments and remains motivated to be in treatment. The patient verbalized understanding of her discharge instructions. DISCHARGE DIAGNOSIS:  Unspecified mood disorder. Stimulant use disorder, cocaine, severe. Opioid use disorder. Cannabis use disorder. There are no discharge medications for this patient.        No results found for: VALF2, VALAC, VALP, VALPR, DS6, CRBAM, CRBAMP, CARB2, XCRBAM  No results found for: LITHM    Follow-up Information       Follow up With Specialties Details Why Contact Info    [facility] Obstetrics & Gynecology  Follow up on 9/14/2022  Located in: [facility]  Address: [address], [facility]  Hours:   Open · Closes 5PM  Phone: [phone]    Roxana Recinos  Go on 9/6/2022 For Follow Up with Dr. Magdalena Martinez / Belvie Goldberg [address] [place] [address]   [phone]          WOUND CARE: none needed. PROGNOSIS:   Good / Fair based on nature of patient's pathology/ies and treatment compliance issues. Prognosis is greatly dependent upon patient's ability to  follow up on psychiatric/psychotherapy appointments as well as to comply with psychiatric medications as prescribed.

## 2022-09-02 NOTE — PROGRESS NOTES
MUSIC THERAPY GROUP PROGRESS NOTE        9/2/2022    The patient (pt) Julieta Diehl a 40 y.o. female participated in [address] [place] [address] group on [facility]. Group time: 11:00-11:45     Personal goal for participation: Pt will remain present and will actively participate at least once during the group time. Goal orientation: Social interaction     Group therapy participation: Pt listened to the group during the final one quarter of the group time for which she was present. Therapeutic interventions: Group instrument playing, active listening. Observation of participation: The patient shared her name about one quarter of the time into the group session, and then exited to receive care. She re-entered during the last one quarter of the group time. MT offered for the pt to choose a percussion instrument to play and participate. Pt declined and said she'd like to watch/listen. Pt remained present for the rest of the group.     Karon Sebastian MT-BC (Music Therapist-Board Certified)  Spiritual Care Department  Referral-based service

## 2022-09-02 NOTE — PROGRESS NOTES
Problem: Falls - Risk of  Goal: *Absence of Falls  Description: Document Lin Blood Fall Risk and appropriate interventions in the flowsheet. Outcome: Progressing Towards Goal  Note: Fall Risk Interventions:            Medication Interventions: Teach patient to arise slowly    Resting in bed with eyes closed, no complaints, no distress noted. Safety measures in place, will continue to monitor.

## 2022-09-04 LAB
C TRACH RRNA SPEC QL NAA+PROBE: NEGATIVE
N GONORRHOEA RRNA SPEC QL NAA+PROBE: NEGATIVE
SPECIMEN SOURCE: NORMAL